# Patient Record
Sex: FEMALE | Race: WHITE | Employment: FULL TIME | URBAN - METROPOLITAN AREA
[De-identification: names, ages, dates, MRNs, and addresses within clinical notes are randomized per-mention and may not be internally consistent; named-entity substitution may affect disease eponyms.]

---

## 2017-02-28 ENCOUNTER — OFFICE VISIT (OUTPATIENT)
Dept: FAMILY MEDICINE CLINIC | Facility: CLINIC | Age: 37
End: 2017-02-28

## 2017-02-28 VITALS
WEIGHT: 110 LBS | TEMPERATURE: 98 F | HEIGHT: 61 IN | SYSTOLIC BLOOD PRESSURE: 108 MMHG | BODY MASS INDEX: 20.77 KG/M2 | HEART RATE: 88 BPM | RESPIRATION RATE: 18 BRPM | DIASTOLIC BLOOD PRESSURE: 74 MMHG | OXYGEN SATURATION: 98 %

## 2017-02-28 DIAGNOSIS — J01.40 ACUTE PANSINUSITIS, RECURRENCE NOT SPECIFIED: Primary | ICD-10-CM

## 2017-02-28 PROCEDURE — 99213 OFFICE O/P EST LOW 20 MIN: CPT | Performed by: PHYSICIAN ASSISTANT

## 2017-02-28 RX ORDER — CODEINE PHOSPHATE AND GUAIFENESIN 10; 100 MG/5ML; MG/5ML
10 SOLUTION ORAL EVERY 6 HOURS PRN
Qty: 120 ML | Refills: 0 | Status: SHIPPED | OUTPATIENT
Start: 2017-02-28 | End: 2017-03-10

## 2017-02-28 RX ORDER — AMOXICILLIN AND CLAVULANATE POTASSIUM 875; 125 MG/1; MG/1
1 TABLET, FILM COATED ORAL 2 TIMES DAILY
Qty: 20 TABLET | Refills: 0 | Status: SHIPPED | OUTPATIENT
Start: 2017-02-28 | End: 2017-03-10

## 2017-02-28 RX ORDER — FLUTICASONE PROPIONATE 50 MCG
2 SPRAY, SUSPENSION (ML) NASAL DAILY
Qty: 16 G | Refills: 0 | Status: SHIPPED | OUTPATIENT
Start: 2017-02-28 | End: 2017-03-05

## 2017-03-01 NOTE — PATIENT INSTRUCTIONS
Acute Sinusitis    Acute sinusitis is irritation and swelling of the sinuses. It is usually caused by a viral infection after a common cold. Your doctor can help you find relief. What is acute sinusitis?   Sinuses are air-filled spaces in the skull behin © 5388-8532 [address], [address] [place]. All rights reserved. This information is not intended as a substitute for professional medical care. Always follow your healthcare professional's instructions.

## 2017-03-01 NOTE — PROGRESS NOTES
Johnny Cash is a 29year old female. Patient presents with:  Cough: head pressure, sore throat, nasal congestion, x 2 days    HPI:    Symptoms started 2 weeks ago, got better for a short period of time and worsening again over the past 2 days. Signed Prescriptions Disp Refills    Amoxicillin-Pot Clavulanate (AUGMENTIN) 875-125 MG Oral Tab 20 tablet 0      Sig: Take 1 tablet by mouth 2 (two) times daily.       Fluticasone Propionate 50 MCG/ACT Nasal Suspension 16 g 0      Si sprays by Nasal ro Treating acute sinusitis  Treatment is aimed at unblocking the sinus opening and helping the cilia work again. You may need to take antihistamine and decongestant medicine. These can reduce inflammation and decrease the amount of fluid your sinuses make.  I

## 2017-03-07 ENCOUNTER — OFFICE VISIT (OUTPATIENT)
Dept: FAMILY MEDICINE CLINIC | Facility: CLINIC | Age: 37
End: 2017-03-07

## 2017-03-07 VITALS
RESPIRATION RATE: 18 BRPM | HEART RATE: 71 BPM | HEIGHT: 62 IN | DIASTOLIC BLOOD PRESSURE: 68 MMHG | OXYGEN SATURATION: 97 % | WEIGHT: 111 LBS | BODY MASS INDEX: 20.43 KG/M2 | SYSTOLIC BLOOD PRESSURE: 112 MMHG | TEMPERATURE: 98 F

## 2017-03-07 DIAGNOSIS — J40 BRONCHITIS: Primary | ICD-10-CM

## 2017-03-07 PROCEDURE — 99213 OFFICE O/P EST LOW 20 MIN: CPT | Performed by: NURSE PRACTITIONER

## 2017-03-07 RX ORDER — ALBUTEROL SULFATE 90 UG/1
2 AEROSOL, METERED RESPIRATORY (INHALATION) EVERY 4 HOURS PRN
Qty: 1 INHALER | Refills: 0 | Status: SHIPPED | OUTPATIENT
Start: 2017-03-07 | End: 2017-03-17

## 2017-03-07 RX ORDER — ALBUTEROL SULFATE 2.5 MG/3ML
2.5 SOLUTION RESPIRATORY (INHALATION) EVERY 4 HOURS PRN
Qty: 1 BOX | Refills: 0 | Status: SHIPPED | OUTPATIENT
Start: 2017-03-07 | End: 2017-03-17

## 2017-03-07 RX ORDER — PREDNISONE 20 MG/1
40 TABLET ORAL DAILY
Qty: 10 TABLET | Refills: 0 | Status: SHIPPED | OUTPATIENT
Start: 2017-03-07 | End: 2017-03-12

## 2017-03-08 NOTE — PROGRESS NOTES
HPI:   Roseanna Rahman is a 29year old female who presents for recheck of ill symptoms. She was seen on 2/28 for pansinusitis x 2 days and treated with Augmentin, Cheratussin and Flonase.  She states she has not improve despite taking the Augmentin, Fl GI: good BS's,no masses, HSM or tenderness    ASSESSMENT AND PLAN:    PLAN:Gabriela was seen today for cough. Diagnoses and all orders for this visit:    Bronchitis  -     Albuterol Sulfate  (90 Base) MCG/ACT Inhalation Aero Soln;  Inhale 2 puffs If there is a lot of inflammation, air flow is restricted. The air passages may also go into spasm, especially if you have asthma. This causes wheezing and difficulty breathing even in people who do not have asthma. Bronchitis usually lasts 7 to 14 days. · If prescribed, finish all antibiotic medicine, even if you are feeling better after only a few days. Follow-up care  Follow up with your healthcare provider, or as advised. If you had an X-ray or ECG (electrocardiogram), a specialist will review it.  You

## 2017-03-08 NOTE — PATIENT INSTRUCTIONS
· Take Albuterol nebulizer OR inhaler 2 puffs every 4 to 6 hours as needed for cough/wheeze. · Start Prednisone 40 mg daily tomorrow morning x 5 days. · Use probiotics while on antibiotics and beyond to replace good bacteria to the gut.   · May stop Chera clots.) Aspirin should never be given to anyone younger than 25years of age who is ill with a viral infection or fever. It may cause severe liver or brain damage. · Your appetite may be poor, so a light diet is fine.  Avoid dehydration by drinking 6 to 8 Reviewed: 9/13/2015  © 7385-7098 [address], [address] [place], [place] [address]. All rights reserved. This information is not intended as a substitute for professional medical care.  Always follow your healthcare professional's instruc

## 2018-06-13 ENCOUNTER — LAB SERVICES (OUTPATIENT)
Dept: OTHER | Age: 38
End: 2018-06-13

## 2018-06-13 ENCOUNTER — CHARTING TRANS (OUTPATIENT)
Dept: OTHER | Age: 38
End: 2018-06-13

## 2018-06-17 LAB
APPEARANCE: YELLOW
BILIRUBIN: NORMAL
COLOR: YELLOW
GLUCOSE U: NORMAL
KETONES: NORMAL
LEUKOCYTES: 125
NITRITE: NORMAL
PH: 5
PROTEIN: NORMAL
URINE SPEC GRAVITY: 1.01
UROBILINOGEN: NORMAL

## 2018-11-01 VITALS
WEIGHT: 115 LBS | DIASTOLIC BLOOD PRESSURE: 80 MMHG | HEIGHT: 63 IN | SYSTOLIC BLOOD PRESSURE: 120 MMHG | BODY MASS INDEX: 20.38 KG/M2 | TEMPERATURE: 98.2 F

## (undated) NOTE — MR AVS SNAPSHOT
[facility] [address] [facility]  [address] [facility]  [place] [address]  [phone]               Thank you for choosing us for your health care visit with Pritesh Gonzalez PA-C. We are glad to serve you and happy to provide you with this summary of your visit.   Miriam Cali Most will ask about your symptoms and health history. He or she will look at your ear, nose, and throat. You usually won't need to have X-rays taken.    The doctor may take a sample of mucus to check for bacteria.  If you have sinusitis that keeps co [address] [facility], [address] [place] [address], [address] [place] [address]     Phone:  [phone]    - Amoxicillin-Pot Clavulanate 875-125 MG Tabs  - Fluticasone Propionate 50 MCG/ACT Susp      You can get these medications from any pharmacy

## (undated) NOTE — MR AVS SNAPSHOT
[facility] [address] [facility] [address] [facility]  [place] [address] [facility] [address] [phone]               Thank you for choosing us for your health care visit with YOU Garcia. We are glad to serve you and happy to provide you with this summary of your visit.   Please h Bronchitis usually lasts 7 to 14 days. The wheezing should improve with treatment during the first week. An inhaler is often prescribed to relax the air passages and stop wheezing.  Antibiotics will be prescribed if your doctor thinks there is also a second notified of any new findings that may affect your care.   Note: If you are age 72 or older, or if you have a chronic lung disease or condition that affects your immune system, or you smoke, talk to your healthcare provider about having a pneumococcal vaccin Take 10 mL by mouth every 6 (six) hours as needed for cough. Commonly known as:  CHERATUSSIN AC           predniSONE 20 MG Tabs   Take 2 tablets (40 mg total) by mouth daily. Commonly known as:  DELTASONE           * Notice:   This list has 2 medication